# Patient Record
Sex: FEMALE | Employment: FULL TIME | URBAN - METROPOLITAN AREA
[De-identification: names, ages, dates, MRNs, and addresses within clinical notes are randomized per-mention and may not be internally consistent; named-entity substitution may affect disease eponyms.]

---

## 2018-03-14 DIAGNOSIS — S62.624P CLOSED DISPLACED FRACTURE OF MIDDLE PHALANX OF RIGHT RING FINGER WITH MALUNION, SUBSEQUENT ENCOUNTER: ICD-10-CM

## 2018-03-29 ENCOUNTER — OFFICE VISIT (OUTPATIENT)
Dept: ORTHOPEDIC SURGERY | Age: 53
End: 2018-03-29
Payer: COMMERCIAL

## 2018-03-29 VITALS
TEMPERATURE: 98.3 F | DIASTOLIC BLOOD PRESSURE: 74 MMHG | HEIGHT: 62 IN | BODY MASS INDEX: 31.28 KG/M2 | HEART RATE: 83 BPM | SYSTOLIC BLOOD PRESSURE: 129 MMHG | WEIGHT: 170 LBS | RESPIRATION RATE: 16 BRPM

## 2018-03-29 DIAGNOSIS — S62.624P CLOSED DISPLACED FRACTURE OF MIDDLE PHALANX OF RIGHT RING FINGER WITH MALUNION, SUBSEQUENT ENCOUNTER: Primary | ICD-10-CM

## 2018-03-29 PROCEDURE — 99213 OFFICE O/P EST LOW 20 MIN: CPT | Performed by: ORTHOPAEDIC SURGERY

## 2018-03-29 NOTE — PROGRESS NOTES
Chief Complaint   Patient presents with    Hand Pain     Ring finger Rt         Zi Hannah is a 46y.o. year old  who presents for follow up of Right ring finger injury. Overall she reports that therapy did well. She reports only intermittent symptoms that she hits the finger. Her other complaint is stiffness about the finger. She is performing her regular job duties without difficulty. She did have her CT scan completed. I did review her CT scan from [facility]. Report as well as images were reviewed. There is a volar middle phalanx injury to the ring finger with intra-articular step-off and impaction involving the volar ulnar corner. Over 60% of the joint surface is involved in the fracture pattern. The radial aspect middle phalanx is preserved. The joint remains concentrically reduced. Her biggest complaint is angulatory deformity to the finger as well as stiffness and occasional achiness in the joint. Past Medical History:   Diagnosis Date    Arthritis      Past Surgical History:   Procedure Laterality Date     SECTION      DILATION AND CURETTAGE OF UTERUS      TUBAL LIGATION         Current Outpatient Prescriptions:     ibuprofen (ADVIL;MOTRIN) 200 MG tablet, Take 200 mg by mouth every 6 hours as needed for Pain, Disp: , Rfl:   Allergies   Allergen Reactions    Keflex [Cephalexin] Rash     Social History     Social History    Marital status: Unknown     Spouse name: N/A    Number of children: N/A    Years of education: N/A     Occupational History    Not on file.      Social History Main Topics    Smoking status: Never Smoker    Smokeless tobacco: Never Used    Alcohol use No    Drug use: No    Sexual activity: Not on file     Other Topics Concern    Not on file     Social History Narrative    No narrative on file     Family History   Problem Relation Age of Onset    Cancer Mother     Alzheimer's Disease Mother     Cancer Father     Heart Disease Father        Skin: (-)